# Patient Record
Sex: FEMALE | Race: WHITE | NOT HISPANIC OR LATINO | Employment: FULL TIME | URBAN - METROPOLITAN AREA
[De-identification: names, ages, dates, MRNs, and addresses within clinical notes are randomized per-mention and may not be internally consistent; named-entity substitution may affect disease eponyms.]

---

## 2022-08-13 ENCOUNTER — OFFICE VISIT (OUTPATIENT)
Dept: URGENT CARE | Facility: URGENT CARE | Age: 67
End: 2022-08-13
Payer: MEDICARE

## 2022-08-13 VITALS
OXYGEN SATURATION: 100 % | WEIGHT: 138 LBS | TEMPERATURE: 97.2 F | DIASTOLIC BLOOD PRESSURE: 82 MMHG | HEIGHT: 64 IN | SYSTOLIC BLOOD PRESSURE: 181 MMHG | BODY MASS INDEX: 23.56 KG/M2 | HEART RATE: 84 BPM | RESPIRATION RATE: 16 BRPM

## 2022-08-13 DIAGNOSIS — N39.0 URINARY TRACT INFECTION WITHOUT HEMATURIA, SITE UNSPECIFIED: Primary | ICD-10-CM

## 2022-08-13 LAB
ALBUMIN UR-MCNC: NEGATIVE MG/DL
APPEARANCE UR: ABNORMAL
BACTERIA #/AREA URNS HPF: ABNORMAL /HPF
BILIRUB UR QL STRIP: NEGATIVE
COLOR UR AUTO: YELLOW
GLUCOSE UR STRIP-MCNC: NEGATIVE MG/DL
HGB UR QL STRIP: ABNORMAL
KETONES UR STRIP-MCNC: NEGATIVE MG/DL
LEUKOCYTE ESTERASE UR QL STRIP: ABNORMAL
NITRATE UR QL: NEGATIVE
PH UR STRIP: 7 [PH] (ref 5–7)
RBC #/AREA URNS AUTO: ABNORMAL /HPF
SP GR UR STRIP: 1.01 (ref 1–1.03)
UROBILINOGEN UR STRIP-ACNC: 0.2 E.U./DL
WBC #/AREA URNS AUTO: ABNORMAL /HPF
WBC CLUMPS #/AREA URNS HPF: PRESENT /HPF

## 2022-08-13 PROCEDURE — 87086 URINE CULTURE/COLONY COUNT: CPT

## 2022-08-13 PROCEDURE — 81001 URINALYSIS AUTO W/SCOPE: CPT

## 2022-08-13 PROCEDURE — 99203 OFFICE O/P NEW LOW 30 MIN: CPT

## 2022-08-13 PROCEDURE — 87186 SC STD MICRODIL/AGAR DIL: CPT

## 2022-08-13 RX ORDER — SULFAMETHOXAZOLE/TRIMETHOPRIM 800-160 MG
1 TABLET ORAL 2 TIMES DAILY
Qty: 10 TABLET | Refills: 0 | Status: SHIPPED | OUTPATIENT
Start: 2022-08-13 | End: 2022-08-18

## 2022-08-13 NOTE — PROGRESS NOTES
"  Assessment & Plan     Urinary tract infection without hematuria, site unspecified    - UA macro with reflex to Microscopic and Culture - Clinc Collect  - Urine Microscopic Exam  - Urine Culture  - sulfamethoxazole-trimethoprim (BACTRIM DS) 800-160 MG tablet; Take 1 tablet by mouth 2 times daily for 5 days    Treat with Bactrim.  Culture pending for sensitivities.  Increased fluids.  Close Follow-up if any new or worsening sx prn.    Jennifer Brown MD   Urgent Care Provider  [facility] Parkinson is a 66 year old, presenting for the following health issues:  Urgent Care and UTI (UTI symptoms x2 weeks, worsening x3 days. Cloudy urine.  )      HPI   Intermittent UTI sx x 2 weeks worsening in past 3 days.  No fever or flank pain.  Tolerating po well.  No N/V.        Review of Systems   Constitutional, HEENT, cardiovascular, pulmonary, GI, , musculoskeletal, neuro, skin, endocrine and psych systems are negative, except as otherwise noted.      Objective    BP (!) 181/82   Pulse 84   Temp 97.2  F (36.2  C) (Oral)   Resp 16   Ht 1.626 m (5' 4\")   Wt 62.6 kg (138 lb)   SpO2 100%   BMI 23.69 kg/m    Body mass index is 23.69 kg/m .  Physical Exam   GENERAL: healthy, alert and no distress  EYES: Eyes grossly normal to inspection, PERRL and conjunctivae and sclerae normal  ABDOMEN: soft, nontender  MS: no gross musculoskeletal defects noted, no edema  PSYCH: mentation appears normal, affect normal/bright    Results for orders placed or performed in visit on 08/13/22 (from the past 24 hour(s))   UA macro with reflex to Microscopic and Culture - Clinc Collect    Specimen: Urine, Midstream   Result Value Ref Range    Color Urine Yellow Colorless, Straw, Light Yellow, Yellow    Appearance Urine Slightly Cloudy (A) Clear    Glucose Urine Negative Negative mg/dL    Bilirubin Urine Negative Negative    Ketones Urine Negative Negative mg/dL    Specific Gravity Urine 1.010 1.003 - " 1.035    Blood Urine Trace (A) Negative    pH Urine 7.0 5.0 - 7.0    Protein Albumin Urine Negative Negative mg/dL    Urobilinogen Urine 0.2 0.2, 1.0 E.U./dL    Nitrite Urine Negative Negative    Leukocyte Esterase Urine Moderate (A) Negative   Urine Microscopic Exam   Result Value Ref Range    Bacteria Urine Few (A) None Seen /HPF    RBC Urine 0-2 0-2 /HPF /HPF    WBC Urine 10-25 (A) 0-5 /HPF /HPF    WBC Clumps Urine Present (A) None Seen /HPF                   .  ..

## 2022-08-15 ENCOUNTER — TELEPHONE (OUTPATIENT)
Dept: URGENT CARE | Facility: URGENT CARE | Age: 67
End: 2022-08-15

## 2022-08-15 DIAGNOSIS — N30.00 ACUTE CYSTITIS WITHOUT HEMATURIA: Primary | ICD-10-CM

## 2022-08-15 LAB
BACTERIA UR CULT: ABNORMAL
BACTERIA UR CULT: ABNORMAL

## 2022-08-15 RX ORDER — CEPHALEXIN 500 MG/1
500 CAPSULE ORAL 2 TIMES DAILY
Qty: 14 CAPSULE | Refills: 0 | Status: SHIPPED | OUTPATIENT
Start: 2022-08-15

## 2022-08-16 ENCOUNTER — NURSE TRIAGE (OUTPATIENT)
Dept: NURSING | Facility: CLINIC | Age: 67
End: 2022-08-16

## 2022-08-16 RX ORDER — NITROFURANTOIN 25; 75 MG/1; MG/1
100 CAPSULE ORAL 2 TIMES DAILY
Qty: 14 CAPSULE | Refills: 0 | Status: CANCELLED | OUTPATIENT
Start: 2022-08-16 | End: 2022-08-23

## 2022-08-16 NOTE — TELEPHONE ENCOUNTER
Patient calling to clarify which abx she should be taking. Patient states she was called last night and instructed to take cephalexin, then spoke to a nurse this morning who mentioned patient would maybe be prescribed Macrobid.    Provider please clarify what patient should be taking for infection, patient is back home in [place] and is anxious about taking the correct medication - please review and advise     Per result notes - both keflex and macrobid noted     Patient would like a call back asap - [phone] - ok to leave a detailed VM     Emiliano Smalls RN  [facility]

## 2022-08-16 NOTE — TELEPHONE ENCOUNTER
Call to patient. Patient informed of Dr. Brown's result note. Patient verbalized understanding.     Patient requesting new antibiotic be sent in to the below pharmacy:    [facility]   [address] [place] [address]   [id number]    Pharmacy pended.       Routing to provider to place order for new antibiotic. Order pended for provider to complete, review, and sign if appropriate. Thank you!       Gifty Peterson RN BSN MSN  [facility]

## 2022-08-16 NOTE — TELEPHONE ENCOUNTER
Call back from patient asking for clarification of which antibiotic she should take.    Patient is asking for an answer tonight.  Patient says she is starting to feel feverish and sick and wants to start taking the right antibiotic for her UTI.    UC and susceptibility is in lab result.    Please call patient back.    Ken Joyce RN/[facility]

## 2022-08-16 NOTE — TELEPHONE ENCOUNTER
Lengthy conversation with patient.  She has keflex but is confused about the call she received stating she was going to get an Rx for macrobid.  I explained that keflex was sufficient given her urinary culture.  She will stop bactrim and take keflex.

## 2022-08-16 NOTE — TELEPHONE ENCOUNTER
S-(situation): Call from patient who was seen by Dr. Brown for UTI.    Culture is back but two providers recommended two different antibiotics.    Previous message routed to providers but no response yet.      B-(background):       A-(assessment): patient needs response ASAP      R-(recommendations): re-routed to [facility].    Caller verbalized understanding.          Ken Joyce RN/[facility]      Reason for Disposition    Caller has URGENT medicine question about med that PCP or specialist prescribed and triager unable to answer question    Protocols used: MEDICATION QUESTION CALL-A-[place]